# Patient Record
(demographics unavailable — no encounter records)

---

## 2025-03-28 NOTE — HISTORY OF PRESENT ILLNESS
[FreeTextEntry1] : 47yo P3 LMP 4/2024 presents for TEB to discuss having no menses since April 2024, decreased libido and vaginal dryness.  Pt has been on OCPS for a few years to help manage benson-meonopausal sx with good effect.  [place] recently pt has noticed decreased libido adn is concerned for the impact it may have on her marriage.  Pt also previously always had withdrawal bleeding on OCPS.  She denies hot flashes.  Uses lubrication with some effect. No other c/o.

## 2025-03-28 NOTE — REASON FOR VISIT
[Home] : at home, [unfilled] , at the time of the visit. [Medical Office: ([facility])___] : at the medical office located in  [Telehealth (audio & video)] : This visit was provided via telehealth using real-time 2-way audio visual technology. [Verbal consent obtained from patient] : the patient, [unfilled]

## 2025-05-01 NOTE — PHYSICAL EXAM
[MA] : MA [Appropriately responsive] : appropriately responsive [Alert] : alert [No Acute Distress] : no acute distress [No Lymphadenopathy] : no lymphadenopathy [Soft] : soft [Non-tender] : non-tender [Non-distended] : non-distended [No HSM] : No HSM [No Lesions] : no lesions [No Mass] : no mass [Oriented x3] : oriented x3 [Examination Of The Breasts] : a normal appearance [No Discharge] : no discharge [No Masses] : no breast masses were palpable [Labia Majora] : normal [Labia Minora] : normal [Normal] : normal [Uterine Adnexae] : normal [FreeTextEntry2] : Natty

## 2025-05-01 NOTE — HISTORY OF PRESENT ILLNESS
[Y] : Patient is sexually active [FreeTextEntry1] : 48 year old P3 LMP: 04/16/24 presents for annual gyn visit. Pt c/o vulvar irritation. She has normal menses. She denies intermenstrual bleeding, itching, burning, or abnormal discharge.     [Mammogramdate] : 09/24 [BreastSonogramDate] : 09/24 [PapSmeardate] : 04/24 [ColonoscopyDate] : 11/2022